# Patient Record
(demographics unavailable — no encounter records)

---

## 2024-10-17 NOTE — PHYSICAL EXAM
[Normal Breath Sounds] : Normal breath sounds [Wheezing] : wheezing was heard [No Rash or Lesion] : No rash or lesion [Alert] : alert [Oriented to Person] : oriented to person [Oriented to Place] : oriented to place [Calm] : calm [de-identified] : Healthy-appearing woman in no acute distress [de-identified] : CATHRYN, BHAVANI, LORRIMI. [de-identified] : Soft, nontender nondistended, positive bowel sounds in all four quads.  No hernia or masses.  Cellulitis has returned.  Cloudy persistent drainage from previous drain site. [de-identified] : Ambulating without difficulty or assistance

## 2024-10-17 NOTE — ASSESSMENT
[FreeTextEntry1] : Drainage from previously Vlad drain site is near completely resolved.  Patient denies fevers, chills, sweats or worsening abdominal symptoms or pain.  Abdomen remains soft without distention, fullness or fluctuance in the surgical site.  Continue surveillance and monitoring for signs of recurrent fluid collections or abscess. Follow-up as needed  Patient will continue to follow-up with Dr. Osborn for routine post bariatric care given her history of sleeve gastrectomy.

## 2024-10-17 NOTE — HISTORY OF PRESENT ILLNESS
[de-identified] : s/p I&D of Abdominal wall (periumbilical) abscess. [de-identified] : Persistent cloudy drainage from drain tract

## 2025-02-07 NOTE — HISTORY OF PRESENT ILLNESS
[de-identified] : s/p I&D of Abdominal wall (periumbilical) abscess. [de-identified] : Persistent cloudy drainage from drain tract [Procedure: ___] : Procedure performed: [unfilled]  [Date of Surgery: ___] : Date of Surgery:   [unfilled] [Surgeon Name:   ___] : Surgeon Name: Dr. HUGHES [Pre-Op Weight ___] : Pre-op weight was [unfilled] lbs

## 2025-02-07 NOTE — ASSESSMENT
[FreeTextEntry1] : Low midline surgical scar well-healed however the patient continues to have palpable mass in the subcutaneous tissues.  This may represent redundant scar tissue or possibly re-accumulation of subcutaneous fluid versus new or recurrent herniation.  CT scan of the abdomen and pelvis has been recommended.  Overlying skin remains intact without erythema, or fluctuance.  Patient denies systemic symptoms such as fever, chills or sweats.  The area remains nontender although quite protuberant.  Follow-up with me upon completion of the CT scan to discuss further treatment options.

## 2025-02-07 NOTE — PHYSICAL EXAM
[Normal Breath Sounds] : Normal breath sounds [Wheezing] : wheezing was heard [No Rash or Lesion] : No rash or lesion [Alert] : alert [Oriented to Person] : oriented to person [Oriented to Place] : oriented to place [Calm] : calm [de-identified] : Healthy-appearing woman in no acute distress [de-identified] : CATHRYN, BHAVANI, LORRIMI. [de-identified] : Soft, nontender nondistended, positive bowel sounds in all four quads.  Cellulitis has resolved and incision remains healed nicely.  Palpable subcutaneous mass/fullness.  No fluctuance. [de-identified] : Ambulating without difficulty or assistance

## 2025-02-19 NOTE — DATA REVIEWED
[FreeTextEntry1] : EXAM: [id number] - CT ABDOMEN AND PELVIS IC  - ORDERED BY: HARJIT LIVINGSTON PROCEDURE DATE:  02/08/2025 INTERPRETATION:  CLINICAL INFORMATION: Abdominal wall mass COMPARISON CT: 8/27/2024 CONTRAST/COMPLICATIONS: IV Contrast: Omnipaque 350  90 cc administered   10 cc discarded Oral Contrast: Smoothie Readi-Cat 2 PROCEDURE: CT of the Abdomen and Pelvis was performed. Sagittal and coronal reformats were performed. FINDINGS: LOWER CHEST: Bibasilar subsegmental atelectasis/scarring. LIVER: Within normal limits. BILE DUCTS: There is no biliary ductal dilatation. GALLBLADDER: Within normal limits. SPLEEN: Within normal limits. PANCREAS: Within normal limits. ADRENALS: Within normal limits.. Postsurgical change of the stomach KIDNEYS/URETERS: Symmetric renal enhancement. No hydronephrosis. VESSELS: Within normal limits. RETROPERITONEUM/LYMPH NODES: No lymphadenopathy. BLADDER: Within normal limits. REPRODUCTIVE ORGANS: Fibroid uterus PERITONEUM/MESENTERY/BOWEL: No bowel obstruction, ascites or free intraperitoneal air. BONES/SOFT TISSUES: Degenerative changes of the spine. Surgical changes midline abdominal pelvic wall. Anterior abdominal pelvic wall subcutaneous fluid collection measuring 9.8 x 7.2 x 11.1 cm IMPRESSION: Anterior abdominal pelvic wall subcutaneous fluid collection measuring 9.8 x 7.2 x 11.1 cm --- End of Report --- LORI DAMON MD; Attending Radiologist This document has been electronically signed. Feb 8 2025 10:29PM

## 2025-02-19 NOTE — HISTORY OF PRESENT ILLNESS
[de-identified] : Exceptionally pleasant patient known to me via practice and prior phone contact. Ms. Bradley is now s/p removal of infected surgical mesh and primary closure of abdominal wall. Since OR, she has noted a progressive SQ fullness, but denies any pain, systemic complaints or ongoing GI issues. Given size of collection/ seroma on diagnostic CT, this prompted IR drainage with ultimately a catheter being left. Today, she is in good spirits and reports improvement with light serosanguineous drainage of ~ 30 to 40 cc daily with VNS in place.

## 2025-02-19 NOTE — REVIEW OF SYSTEMS
[Feeling Poorly] : not feeling poorly [Feeling Tired] : not feeling tired [As Noted in HPI] : as noted in HPI [Anxiety] : anxiety [Depression] : no depression [Negative] : Heme/Lymph [de-identified] : regarding this issue and visit.

## 2025-02-19 NOTE — PHYSICAL EXAM
[Respiratory Effort] : normal respiratory effort [Normal Rate and Rhythm] : normal rate and rhythm [No HSM] : no hepatosplenomegaly [Tender] : was nontender [Enlarged] : not enlarged [No Rash or Lesion] : No rash or lesion [Alert] : alert [Oriented to Person] : oriented to person [Oriented to Place] : oriented to place [Oriented to Time] : oriented to time [Anxious] : anxious [de-identified] : Appears well, no acute distress, ambulates easily into office and assumes examination table without need of assistance.  [de-identified] : Normocephalic and atraumatic.  [de-identified] : Supple with full range of motion.  [FreeTextEntry1] : No cervical, supraclavicular, axillary or inguinal adenopathy.  [de-identified] : Deferred.  [de-identified] : Abdomen full/ overweight, but soft/ non-tense. Well healing operative incision c/w given history. No visible/ palpable focal SQ collection today. No overlying or surrounding acute inflammatory changes of skin/ soft tissue. IR drain secure/ in place in RLQ with light serosanguineous fluid present. Entire abdomen non-tender. [de-identified] : Deferred.  [de-identified] : Deferred.  [de-identified] : Grossly symmetric and within normal limits without motor or sensory deficits.  [de-identified] : but quite appropriately so and pleasant/ interactive/ appreciative overall.

## 2025-02-26 NOTE — PHYSICAL EXAM
[Respiratory Effort] : normal respiratory effort [Normal Rate and Rhythm] : normal rate and rhythm [No HSM] : no hepatosplenomegaly [No Rash or Lesion] : No rash or lesion [Alert] : alert [Oriented to Person] : oriented to person [Oriented to Place] : oriented to place [Oriented to Time] : oriented to time [Tender] : was nontender [Enlarged] : not enlarged [Calm] : calm [de-identified] : Appears well, no acute distress, ambulates easily into the office.  [de-identified] : Remains normocephalic and atraumatic.  [de-identified] : Still supple with full range of motion.  [de-identified] : Deferred.  [FreeTextEntry1] : Deferred.  [de-identified] : Abdomen full/ overweight, but soft/ non-tense- stable. Well-healing operative incision c/w given history- stable. No visible/ palpable focal SQ collections today. No inflammatory changes of skin/ soft tissue. IR drain secure/ in place in RLQ with light serous fluid present. Entire abdomen remains non-tender. [de-identified] : Deferred.  [de-identified] : Deferred.  [de-identified] : Grossly symmetric and within normal limits without motor or sensory deficit.

## 2025-02-26 NOTE — HISTORY OF PRESENT ILLNESS
[de-identified] : Exceptionally pleasant patient known to me via practice and prior phone contact. Ms. Bradley is now s/p removal of infected surgical mesh and primary closure of abdominal wall. Since OR, she has noted a progressive SQ fullness, but denies any pain, systemic complaints or ongoing GI issues. Given size of collection/ seroma on diagnostic CT, this prompted IR drainage with ultimately a catheter being left. Today, she is in good spirits and reports improvement with light serosanguineous drainage of ~ 30 to 40 cc daily with VNS in place. [de-identified] : Exceptionally pleasant patient is now s/p removal of infected surgical mesh and primary closure of abdominal wall with post-operative collection now drained by IR. She is now on her last day of oral ABX and is pleased with her progress and feels well. She has VNS in place to assist with drain care. She reports ~ 20 cc of output daily with an ongoing 10 cc normal saline flush daily.

## 2025-02-26 NOTE — PLAN
[FreeTextEntry1] : S/P complex ventral hernia repair for removal of contaminated/ infected mesh.  Clinically, doing well overall by ongoing history.  Exam today reassuring without evidence of active infection by history, vitals or exam.  As output from drain tapering by volume and serous by character, will send for interval CT prior to consideration of drain removal.  She agrees to contact me for any interval questions/ concerns/ changes.  Plan for office follow-up in consideration of drain removal following performance of CT.  Advised that no further ABX are required and a drain flush of 5 cc daily is adequate.  Ms. Bradley is pleased and agrees.  I remain available to her.  New StatLock dressing personally reapplied today with drain flushed to assure patency and function.  Please note that more than 50% of face-to-face time was spent in counseling and coordination of care.

## 2025-03-05 NOTE — PLAN
[FreeTextEntry1] : Imaging removed with complete resolution of collection. Reports less than 10cc/day output. Pigtail catheter removed by me today.  Dry dressing applied. Will continue to monitor for recurrence.

## 2025-03-05 NOTE — HISTORY OF PRESENT ILLNESS
[de-identified] : s/p I&D of Abdominal wall (periumbilical) abscess. [de-identified] : Perc drainage of recurrent abdominal wall collection.

## 2025-03-05 NOTE — PHYSICAL EXAM
[Normal Breath Sounds] : Normal breath sounds [Wheezing] : wheezing was heard [No Rash or Lesion] : No rash or lesion [Alert] : alert [Oriented to Person] : oriented to person [Oriented to Place] : oriented to place [Calm] : calm [de-identified] : Healthy-appearing woman in no acute distress [de-identified] : CATHRYN, BHAVANI, LORRIMI. [de-identified] : Soft, nontender nondistended, positive bowel sounds in all four quads.  Cellulitis has resolved and incision remains healed nicely.  Palpable subcutaneous mass/fullness has resolved.  Pigtail catheter drain remains to bulb suction.  Scant serous fluid.  [de-identified] : Ambulating without difficulty or assistance